# Patient Record
Sex: MALE | Race: BLACK OR AFRICAN AMERICAN
[De-identification: names, ages, dates, MRNs, and addresses within clinical notes are randomized per-mention and may not be internally consistent; named-entity substitution may affect disease eponyms.]

---

## 2020-10-09 NOTE — MRI
MRI cervical spine noncontrast:

10/9/2020



HISTORY:

59-year-old male with cervical radiculopathy M 54.12



Dr. Whitehead discussed the findings by telephone with Dr. Donald Hall at 4:05 PM 10/9/2020



COMPARISON:

None



FINDINGS:

There is mild reversal of curvature. No major subluxation. Cervical spinal canal is congenitally smal
l in caliber due to developmentally short pedicles. This is exacerbated by cervical spondylosis as

described below. There are disc herniations at multiple levels. Contiguous with these disc herniation
s, and inseparable from them, there is midline vertically oriented material that has hypointense T2

signal, in the anterior epidural space from the C 2-3 level through mid C5 level, probably representi
ng calcified posterior longitudinal ligament. Alternatively, the other possibility is superiorly

and inferiorly migrated extruded disc material. Calcified posterior longitudinal ligament is favored.
 The combination of that and the disc herniations, result in severe central spinal canal stenosis

at some levels described below. Disc space narrowing is mild at C3-4, moderate to severe at C4-5, mil
d to moderate at C5-6, and mild to moderate at C6-7. There are Modic type I endplate marrow changes

at C4-5.



C1-2: No central spinal canal stenosis.



C2-3: Mild ligamentum flavum thickening, small central disc protrusion, exacerbating the developmenta
lly small caliber spinal canal. Mild to moderate left neural foraminal stenosis. No significant

right neural foraminal stenosis. No high-grade facet DJD.



C3: Calcified posterior longitudinal ligament significantly indents the spinal cord, flattening in th
e AP dimension, and causing severe central spinal canal stenosis.



C3-4: Broad-based disc herniation or disc/osteophyte complex, plus superimposed focal prominent centr
al disc herniation, markedly compresses the spinal cord, flattening in the AP dimension. Severe

central spinal canal stenosis. Small to moderate-sized bilateral uncinate process osteophytes. Modera
te to severe bilateral neural foraminal stenosis. No high-grade facet DJD.



C4: Anterior epidural material indents and markedly flattens the AP dimension of the spinal cord, exa
cerbating the developmentally small caliber spinal canal. Moderate size bilateral uncinate process

osteophytes. Severe bilateral neural foraminal stenosis. No high-grade facet DJD.



C4-5: Combination of retrolisthesis of C4 on C5 moderate sized central and bilateral paracentral disc
 herniation, causing severe cord compression, severely flattening the cord in the AP dimension,

causing extremely severe central spinal canal stenosis with complete obliteration of CSF signal, and 
causing diffuse intramedullary T2 hyperintense signal abnormality which probably represents

myelomalacia.

Moderately large bilateral uncinate process osteophytes cause severe bilateral neural foraminal steno
sis. No high-grade facet DJD.



C5-6: Broad-based disc/osteophyte complex or broad-based disc herniation causes moderate central spin
al canal stenosis. Large bilateral uncinate process osteophytes, left greater than right, causing

very severe bilateral neural foraminal stenosis.



C6-7: Broad-based prominent disc herniation exacerbates the developmentally small caliber spinal aba
l, causing moderate to severe central spinal canal stenosis. Moderate size bilateral uncinate

process osteophytes cause severe bilateral neural foraminal stenosis. Normal bilateral facet joints.



C7-T1: No central spinal canal stenosis. Moderate bilateral neural foraminal stenosis. Mild to modera
te bilateral neural foraminal stenosis.



IMPRESSION:

1.) Cervical spondylosis (consisting of multilevel degenerative disc disease) together with what appe
ars to be calcified posterior longitudinal ligament (alternatively superiorly and inferiorly

migrating extruded disc material) exacerbating a developmentally small caliber spinal canal, causing 
multilevel severe central spinal canal stenosis with high-grade chronic cord compressions, and

multilevel severe bilateral neural foraminal stenosis impinging on bilateral exiting nerve roots.

2) the worst level is at C4-5, where there is extremely severe central spinal canal stenosis and jodie
re cord compression, causing high-grade myelomalacia.



Reported By: Charles Whitehead 

Electronically Signed:  10/9/2020 4:06 PM

## 2020-10-09 NOTE — MRI
MRI lumbar spine noncontrast



HISTORY: Low back pain. Radiculopathy.



FINDINGS: Vertebral body heights are maintained. There is desiccation of the lowest 4 intervertebral 
discs. Conus medullaris has normal appearance.



Edematous bone marrow signal involves the left L4 pedicle and extends throughout the right L4 and L5 
pedicles into the adjacent portions of the vertebral bodies and posterior elements.



T12-L1, L1-2: Mild osteophytosis. Central canal and neural foramina are patent.



L2-3: Mild disc space narrowing. Mild posterior disc bulge with left far lateral disc protrusion that
 includes an annular tear and extends into the neural foramen, compressing the left L2 nerve root.

There is osteophytosis of the facets. Thecal sac and right neural foramen are patent.



L3-4: Mild posterior disc bulge. Far right lateral disc protrusion, injuring the right neural foramen
 and compressing the right L3 nerve root. Posterior component of the disc bulge and circumferential

degenerative changes result in mild stenosis of the central canal and moderate stenosis of the left n
eural foramen.



L4-5: Disc space narrowing. Prominent posterior and bilateral bulge/protrusion of the intervertebral 
discs. Prominent posterior degenerative changes. Severe stenosis of the central canal. Severe right

and moderate left foraminal stenoses.



L5-S1: Disc space narrowing. Minimal degenerative retrolisthesis. Prominent posterior disc bulge/prot
rusion, compressing the ventral aspect of the thecal sac and origin of each S1 nerve root. No

significant thecal sac stenosis, however. Moderate right and severe left foraminal stenoses.



  



IMPRESSION :

Prominent multilevel degenerative changes throughout the lumbar spine as detailed above. Disc protrus
ions most severely compress the right L3 and left L2 nerve root. Clinical correlation regarding

these dermatomes is required.



Stress reaction bone marrow edema involving lower posterior elements, as detailed above, more promine
nt on the left at the L4 and L5 levels.



Reported By: RA Dey 

Electronically Signed:  10/9/2020 3:15 PM

## 2021-02-04 NOTE — RAD
CERVICAL SPINE SERIES:

2/4/21

 

HISTORY: 

Neck pain.

 

Vertebral bodies are normal in height. There is very mild disc narrowing at C3-4. More pronounced dis
c narrowing at C4-5 and mild disc narrowing at C5-6 and C6-7. Facets are in normal alignment. 

 

IMPRESSION: 

Moderate arthritic changes of the spine. 

 

POS: [facility]

## 2022-05-04 ENCOUNTER — HOSPITAL ENCOUNTER (OUTPATIENT)
Dept: HOSPITAL 92 - LABBT | Age: 62
Discharge: HOME | End: 2022-05-04
Attending: NEUROLOGICAL SURGERY
Payer: COMMERCIAL

## 2022-05-04 DIAGNOSIS — Z01.818: Primary | ICD-10-CM

## 2022-05-04 DIAGNOSIS — M48.061: ICD-10-CM

## 2022-05-04 DIAGNOSIS — Z20.822: ICD-10-CM

## 2022-05-04 LAB
ANION GAP SERPL CALC-SCNC: 12 MMOL/L (ref 10–20)
BUN SERPL-MCNC: 13 MG/DL (ref 8.4–25.7)
CALCIUM SERPL-MCNC: 9.3 MG/DL (ref 7.8–10.44)
CHLORIDE SERPL-SCNC: 108 MMOL/L (ref 98–107)
CO2 SERPL-SCNC: 23 MMOL/L (ref 22–29)
CREAT CL PREDICTED SERPL C-G-VRATE: 0 ML/MIN (ref 70–130)
GLUCOSE SERPL-MCNC: 96 MG/DL (ref 70–105)
HGB BLD-MCNC: 14.7 G/DL (ref 13.5–17.5)
MCH RBC QN AUTO: 31.5 PG (ref 27–33)
MCV RBC AUTO: 91.8 FL (ref 81.2–95.1)
PLATELET # BLD AUTO: 233 10X3/UL (ref 150–450)
POTASSIUM SERPL-SCNC: 3.7 MMOL/L (ref 3.5–5.1)
RBC # BLD AUTO: 4.66 10X6/UL (ref 4.32–5.72)
SODIUM SERPL-SCNC: 139 MMOL/L (ref 136–145)
WBC # BLD AUTO: 8.1 10X3/UL (ref 3.5–10.5)

## 2022-05-04 PROCEDURE — 93005 ELECTROCARDIOGRAM TRACING: CPT

## 2022-05-04 PROCEDURE — 80048 BASIC METABOLIC PNL TOTAL CA: CPT

## 2022-05-04 PROCEDURE — U0005 INFEC AGEN DETEC AMPLI PROBE: HCPCS

## 2022-05-04 PROCEDURE — U0003 INFECTIOUS AGENT DETECTION BY NUCLEIC ACID (DNA OR RNA); SEVERE ACUTE RESPIRATORY SYNDROME CORONAVIRUS 2 (SARS-COV-2) (CORONAVIRUS DISEASE [COVID-19]), AMPLIFIED PROBE TECHNIQUE, MAKING USE OF HIGH THROUGHPUT TECHNOLOGIES AS DESCRIBED BY CMS-2020-01-R: HCPCS

## 2022-05-04 PROCEDURE — 93010 ELECTROCARDIOGRAM REPORT: CPT

## 2022-05-04 PROCEDURE — 85027 COMPLETE CBC AUTOMATED: CPT

## 2022-05-09 ENCOUNTER — HOSPITAL ENCOUNTER (OUTPATIENT)
Dept: HOSPITAL 92 - SDC | Age: 62
Setting detail: OBSERVATION
LOS: 1 days | Discharge: HOME | End: 2022-05-10
Attending: NEUROLOGICAL SURGERY | Admitting: NEUROLOGICAL SURGERY
Payer: COMMERCIAL

## 2022-05-09 VITALS — BODY MASS INDEX: 28.7 KG/M2

## 2022-05-09 DIAGNOSIS — M48.07: ICD-10-CM

## 2022-05-09 DIAGNOSIS — M48.062: Primary | ICD-10-CM

## 2022-05-09 DIAGNOSIS — Z98.1: ICD-10-CM

## 2022-05-09 PROCEDURE — 96374 THER/PROPH/DIAG INJ IV PUSH: CPT

## 2022-05-09 PROCEDURE — G0378 HOSPITAL OBSERVATION PER HR: HCPCS

## 2022-05-09 PROCEDURE — 01NB0ZZ RELEASE LUMBAR NERVE, OPEN APPROACH: ICD-10-PCS | Performed by: NEUROLOGICAL SURGERY

## 2022-05-09 PROCEDURE — 76000 FLUOROSCOPY <1 HR PHYS/QHP: CPT

## 2022-05-09 PROCEDURE — 96376 TX/PRO/DX INJ SAME DRUG ADON: CPT

## 2022-05-09 PROCEDURE — C1713 ANCHOR/SCREW BN/BN,TIS/BN: HCPCS

## 2022-05-09 RX ADMIN — CEFAZOLIN SODIUM SCH MLS: 10 INJECTION, POWDER, FOR SOLUTION INTRAVENOUS at 23:57

## 2022-05-09 RX ADMIN — ACETAMINOPHEN AND CODEINE PHOSPHATE PRN TAB: 300; 30 TABLET ORAL at 13:59

## 2022-05-09 RX ADMIN — Medication SCH: at 20:31

## 2022-05-09 RX ADMIN — ACETAMINOPHEN AND CODEINE PHOSPHATE PRN TAB: 300; 30 TABLET ORAL at 21:09

## 2022-05-09 RX ADMIN — CEFAZOLIN SODIUM SCH MLS: 10 INJECTION, POWDER, FOR SOLUTION INTRAVENOUS at 16:32

## 2022-05-10 VITALS — SYSTOLIC BLOOD PRESSURE: 125 MMHG | DIASTOLIC BLOOD PRESSURE: 78 MMHG | TEMPERATURE: 98.3 F

## 2022-05-10 RX ADMIN — CEFAZOLIN SODIUM SCH: 10 INJECTION, POWDER, FOR SOLUTION INTRAVENOUS at 12:24

## 2022-05-10 RX ADMIN — ACETAMINOPHEN AND CODEINE PHOSPHATE PRN TAB: 300; 30 TABLET ORAL at 12:33

## 2022-05-10 RX ADMIN — Medication SCH: at 12:25

## 2022-05-10 RX ADMIN — ACETAMINOPHEN AND CODEINE PHOSPHATE PRN TAB: 300; 30 TABLET ORAL at 04:21

## 2022-05-25 ENCOUNTER — HOSPITAL ENCOUNTER (OUTPATIENT)
Dept: HOSPITAL 92 - TBSIIMAG | Age: 62
Discharge: HOME | End: 2022-05-25
Attending: PHYSICIAN ASSISTANT
Payer: COMMERCIAL

## 2022-05-25 DIAGNOSIS — M47.812: ICD-10-CM

## 2022-05-25 DIAGNOSIS — M48.02: Primary | ICD-10-CM

## 2022-05-25 DIAGNOSIS — Z98.890: ICD-10-CM

## 2022-05-25 PROCEDURE — 72040 X-RAY EXAM NECK SPINE 2-3 VW: CPT
